# Patient Record
Sex: MALE | Race: WHITE | NOT HISPANIC OR LATINO | URBAN - METROPOLITAN AREA
[De-identification: names, ages, dates, MRNs, and addresses within clinical notes are randomized per-mention and may not be internally consistent; named-entity substitution may affect disease eponyms.]

---

## 2020-10-28 ENCOUNTER — APPOINTMENT (OUTPATIENT)
Age: 50
Setting detail: DERMATOLOGY
End: 2020-10-28

## 2020-10-28 VITALS — TEMPERATURE: 98.1 F | HEIGHT: 72 IN | WEIGHT: 260 LBS

## 2020-10-28 DIAGNOSIS — D22 MELANOCYTIC NEVI: ICD-10-CM

## 2020-10-28 DIAGNOSIS — L82.1 OTHER SEBORRHEIC KERATOSIS: ICD-10-CM

## 2020-10-28 DIAGNOSIS — L57.8 OTHER SKIN CHANGES DUE TO CHRONIC EXPOSURE TO NONIONIZING RADIATION: ICD-10-CM

## 2020-10-28 PROBLEM — D22.39 MELANOCYTIC NEVI OF OTHER PARTS OF FACE: Status: ACTIVE | Noted: 2020-10-28

## 2020-10-28 PROBLEM — D22.5 MELANOCYTIC NEVI OF TRUNK: Status: ACTIVE | Noted: 2020-10-28

## 2020-10-28 PROCEDURE — OTHER TREATMENT REGIMEN: OTHER

## 2020-10-28 PROCEDURE — 99203 OFFICE O/P NEW LOW 30 MIN: CPT | Mod: 25

## 2020-10-28 PROCEDURE — OTHER FOLLOW UP FOR NEXT VISIT: OTHER

## 2020-10-28 PROCEDURE — OTHER REASSURANCE: OTHER

## 2020-10-28 PROCEDURE — OTHER COUNSELING: OTHER

## 2020-10-28 PROCEDURE — OTHER SUNSCREEN RECOMMENDATIONS: OTHER

## 2020-10-28 PROCEDURE — 11301 SHAVE SKIN LESION 0.6-1.0 CM: CPT

## 2020-10-28 PROCEDURE — OTHER SHAVE REMOVAL: OTHER

## 2020-10-28 PROCEDURE — OTHER MIPS QUALITY: OTHER

## 2020-10-28 ASSESSMENT — LOCATION DETAILED DESCRIPTION DERM
LOCATION DETAILED: RIGHT LATERAL UPPER BACK
LOCATION DETAILED: SUPERIOR MID FOREHEAD
LOCATION DETAILED: RIGHT ANTERIOR PROXIMAL THIGH
LOCATION DETAILED: LEFT LATERAL ABDOMEN
LOCATION DETAILED: LEFT MEDIAL FOREHEAD
LOCATION DETAILED: RIGHT LATERAL SUPERIOR EYELID
LOCATION DETAILED: SUPERIOR THORACIC SPINE

## 2020-10-28 ASSESSMENT — LOCATION SIMPLE DESCRIPTION DERM
LOCATION SIMPLE: LEFT FOREHEAD
LOCATION SIMPLE: SUPERIOR FOREHEAD
LOCATION SIMPLE: ABDOMEN
LOCATION SIMPLE: UPPER BACK
LOCATION SIMPLE: RIGHT THIGH
LOCATION SIMPLE: RIGHT UPPER BACK
LOCATION SIMPLE: RIGHT SUPERIOR EYELID

## 2020-10-28 ASSESSMENT — LOCATION ZONE DERM
LOCATION ZONE: FACE
LOCATION ZONE: TRUNK
LOCATION ZONE: EYELID
LOCATION ZONE: LEG

## 2020-10-28 ASSESSMENT — PAIN INTENSITY VAS: HOW INTENSE IS YOUR PAIN 0 BEING NO PAIN, 10 BEING THE MOST SEVERE PAIN POSSIBLE?: NO PAIN

## 2020-10-28 NOTE — HPI: SKIN LESIONS
How Severe Is Your Skin Lesion?: mild
Have Your Skin Lesions Been Treated?: not been treated
Is This A New Presentation, Or A Follow-Up?: Skin Lesions
Additional History: Patient here to establish care. It has been several years since he saw a dermatologist. He has had pre-cancerous moles removed before but no history of actual skin cancer. He has several spots he would like checked to make sure they are not dangerous. None of the lesions are symptomatic

## 2020-10-28 NOTE — PROCEDURE: TREATMENT REGIMEN
Detail Level: Simple
Initiate Treatment: Shave removal with pathology
Plan: Multifocal pigmentation on dermatoscopic exam. Shave removal perform. Follow up will be based on the results. Encouraged patient to come in once a year for his full body skin check

## 2020-10-28 NOTE — PROCEDURE: REASSURANCE
Hide Additional Notes?: No
Additional Note: Otherwise normal upper body exam, no abnormal moles or suspicious lesions. Follow up if lesions change in size, shape or color. Otherwise follow up for TBE.
Detail Level: Zone
Include Location In Plan?: Yes
Additional Note: Otherwise normal trunk exam, patient counseled regarding sunscreen and how to use properly. Follow up for TBE.
Additional Note: Patient reassured lesions are benign and no treatment needed.

## 2020-10-28 NOTE — PROCEDURE: SHAVE REMOVAL
Render Path Notes In Note?: Yes
Detail Level: Detailed
Hemostasis: Electrocautery
Medical Necessity Clause: This procedure was medically necessary because the lesion that was treated was: changing color, suspicious for malignancy
Path Notes (To The Dermatopathologist): Atypical nevus, please evaluate
Consent was obtained from the patient. The risks and benefits to therapy were discussed in detail. Specifically, the risks of infection, scarring, bleeding, prolonged wound healing, incomplete removal, allergy to anesthesia, nerve injury and recurrence were addressed. Prior to the procedure, the treatment site was clearly identified and confirmed by the patient. All components of Universal Protocol/PAUSE Rule completed.
X Size Of Lesion In Cm (Optional): 0.5
Notification Instructions: Patient will be notified of biopsy results. However, patient instructed to call the office if not contacted within 2 weeks.
Post-Care Instructions: I reviewed with the patient in detail post-care instructions. Patient is to keep the biopsy site dry overnight, and then apply bacitracin twice daily until healed. Patient may apply hydrogen peroxide soaks to remove any crusting.
Wound Care: Polysporin ointment
Add Variable For Additional Medical Justification: No
Biopsy Method: Dermablade
Size Of Margin In Cm (Margins Are Not Added To Billing Dimensions): 0.1
Anesthesia Type: 1% Xylocaine with epinephrine
Billing Type: Third-Party Bill
Size Of Lesion In Cm (Required): 0.9
Medical Necessity Information: It is in your best interest to select a reason for this procedure from the list below. All of these items fulfill various CMS LCD requirements except the new and changing color options.
Anesthesia Volume In Cc: 1